# Patient Record
Sex: FEMALE | Race: BLACK OR AFRICAN AMERICAN | URBAN - METROPOLITAN AREA
[De-identification: names, ages, dates, MRNs, and addresses within clinical notes are randomized per-mention and may not be internally consistent; named-entity substitution may affect disease eponyms.]

---

## 2018-08-24 ENCOUNTER — TELEPHONE (OUTPATIENT)
Dept: FAMILY MEDICINE CLINIC | Age: 28
End: 2018-08-24

## 2018-10-23 ENCOUNTER — OFFICE VISIT (OUTPATIENT)
Dept: FAMILY MEDICINE CLINIC | Age: 28
End: 2018-10-23

## 2018-10-23 ENCOUNTER — HOSPITAL ENCOUNTER (OUTPATIENT)
Dept: LAB | Age: 28
Discharge: HOME | End: 2018-10-23
Payer: COMMERCIAL

## 2018-10-23 VITALS
RESPIRATION RATE: 20 BRPM | TEMPERATURE: 98.3 F | DIASTOLIC BLOOD PRESSURE: 84 MMHG | SYSTOLIC BLOOD PRESSURE: 118 MMHG | OXYGEN SATURATION: 98 % | WEIGHT: 271.6 LBS | HEIGHT: 72 IN | BODY MASS INDEX: 36.79 KG/M2 | HEART RATE: 87 BPM

## 2018-10-23 DIAGNOSIS — R40.0 DAYTIME SOMNOLENCE: ICD-10-CM

## 2018-10-23 DIAGNOSIS — G43.109 MIGRAINE WITH AURA AND WITHOUT STATUS MIGRAINOSUS, NOT INTRACTABLE: ICD-10-CM

## 2018-10-23 DIAGNOSIS — R79.89 ELEVATED TSH: Primary | ICD-10-CM

## 2018-10-23 PROBLEM — E66.01 SEVERE OBESITY (HCC): Status: ACTIVE | Noted: 2018-10-23

## 2018-10-23 LAB
ERYTHROCYTE [DISTWIDTH] IN BLOOD BY AUTOMATED COUNT: 16 % (ref 11.6–14.5)
HCT VFR BLD AUTO: 34.3 % (ref 35–45)
HGB BLD-MCNC: 10.6 G/DL (ref 12–16)
MCH RBC QN AUTO: 21.1 PG (ref 24–34)
MCHC RBC AUTO-ENTMCNC: 30.9 G/DL (ref 31–37)
MCV RBC AUTO: 68.2 FL (ref 74–97)
PLATELET # BLD AUTO: 455 K/UL (ref 135–420)
PMV BLD AUTO: 9.5 FL (ref 9.2–11.8)
RBC # BLD AUTO: 5.03 M/UL (ref 4.2–5.3)
WBC # BLD AUTO: 8.1 K/UL (ref 4.6–13.2)

## 2018-10-23 PROCEDURE — 84443 ASSAY THYROID STIM HORMONE: CPT | Performed by: INTERNAL MEDICINE

## 2018-10-23 PROCEDURE — 84481 FREE ASSAY (FT-3): CPT | Performed by: INTERNAL MEDICINE

## 2018-10-23 PROCEDURE — 80053 COMPREHEN METABOLIC PANEL: CPT | Performed by: INTERNAL MEDICINE

## 2018-10-23 PROCEDURE — 83520 IMMUNOASSAY QUANT NOS NONAB: CPT | Performed by: INTERNAL MEDICINE

## 2018-10-23 PROCEDURE — 85027 COMPLETE CBC AUTOMATED: CPT | Performed by: INTERNAL MEDICINE

## 2018-10-23 PROCEDURE — 36415 COLL VENOUS BLD VENIPUNCTURE: CPT | Performed by: INTERNAL MEDICINE

## 2018-10-23 PROCEDURE — 86376 MICROSOMAL ANTIBODY EACH: CPT | Performed by: INTERNAL MEDICINE

## 2018-10-23 RX ORDER — SUMATRIPTAN 50 MG/1
50 TABLET, FILM COATED ORAL
Qty: 9 TAB | Refills: 3 | Status: SHIPPED | OUTPATIENT
Start: 2018-10-23 | End: 2018-10-23

## 2018-10-23 RX ORDER — TOPIRAMATE 25 MG/1
50 TABLET ORAL DAILY
Qty: 60 TAB | Refills: 3 | Status: SHIPPED | OUTPATIENT
Start: 2018-10-23 | End: 2019-01-02 | Stop reason: SDUPTHER

## 2018-10-23 NOTE — PROGRESS NOTES
Assessment/Plan:   
Diagnoses and all orders for this visit: 1. Elevated TSH- off meds. Ck labs and then can start synthroid -     T3, FREE; Future -     THYROID PEROXIDASE (TPO) AB; Future 
-     TSH RECEPTOR AB; Future 2. Migraine with aura and without status migrainosus, not intractable- start topamax. imitrex prn.  F/u in 2 mos. -     topiramate (TOPAMAX) 25 mg tablet; Take 2 Tabs by mouth daily. Start 1/2 tab daily x 1 week, then increase to 1/2 tab bid x 1 week, then increase 1 tab bid 
-     SUMAtriptan (IMITREX) 50 mg tablet; Take 1 Tab by mouth once as needed for Migraine for up to 1 dose. -     METABOLIC PANEL, COMPREHENSIVE; Future 3. Daytime somnolence- consider sleep study given snoring. But will r/o thyroid issues first.  Inadequate sleep is also contributing 
-     CBC W/O DIFF; Future The plan was discussed with the patient. The patient verbalized understanding and is in agreement with the plan. All medication potential side effects were discussed with the patient. Health Maintenance:  
Health Maintenance Topic Date Due  
 DTaP/Tdap/Td series (1 - Tdap) 04/12/2010  PAP AKA CERVICAL CYTOLOGY  09/01/2021  Influenza Age 5 to Adult  Completed David Mathews is a 33 y.o.  female and presents with Establish Care Subjective: 
Pt is here to establish care. Pt c/o migraines. Diagnosed when she was in middle school. Worsened 4 years ago, after having her child. Was put on topamax, which helped. But then she ran out. She's getting migraines almost daily- she feels a pressure on her eyes, frontal region throbbing. +photophobia and phonophobia. +aura. +nausea/vomiting occ. Takes tylenol w/o relief.   +snoring and daytime somnolence. No nocturia. She only gets about 5hrs of sleep per night b/c she works 3 jobs. Hypothyroid - dx during pregnancy 4 years ago.  Hasn't been on meds b/c she didn't have a PCP. No constipation/hair loss or dry skin. Thyroid issues run in the family and mother had thyroid ca. ROS: 
Constitutional: + recent weight change. No weakness/+fatigue. No f/c. Skin: No rashes, change in nails/hair, itching HENT: + HA, no dizziness. No hearing loss/tinnitus. No nasal congestion/discharge. Eyes: No change in vision, double/blurred vision or eye pain/redness. Cardiovascular: No CP/palpitations. No PEREZ/orthopnea/PND. Respiratory: No cough/sputum, dyspnea, wheezing. Gastointestinal: No dysphagia, reflux. No n/v. No constipation/diarrhea. No melena/rectal bleeding. Genitourinary: No dysuria, urinary hesitancy, nocturia, hematuria. No incontinence. Musculoskeletal: No joint pain/stiffness. No muscle pain/tenderness. Endo: + heat/no cold intolerance, no polyuria/polydypsia. Heme: + h/o anemia. No easy bleeding/bruising. Allergy/Immunology: + seasonal rhinitis. Denies frequent colds, sinus/ear infections. Neurological: No seizures/numbness/weakness. No paresthesias. Psychiatric:  No depression, anxiety. PMH: 
Past Medical History:  
Diagnosis Date  Hypothyroid  Migraine PSH: 
Past Surgical History:  
Procedure Laterality Date  HX  SECTION    
  
 
SH: 
Social History Tobacco Use  Smoking status: Never Smoker  Smokeless tobacco: Never Used Substance Use Topics  Alcohol use: Yes Frequency: 2-4 times a month Drinks per session: 3 or 4 Binge frequency: Never Comment: Occation  Drug use: No  
 
FH: 
Family History Problem Relation Age of Onset  Hypertension Mother Gregory.Deem Other Mother Medications/Allergies: No current outpatient medications on file. No Known Allergies Objective: 
Visit Vitals /84 (BP 1 Location: Left arm, BP Patient Position: Sitting) Pulse 87 Temp 98.3 °F (36.8 °C) (Oral) Resp 20 Ht 6' (1.829 m) Wt 271 lb 9.6 oz (123.2 kg) SpO2 98% BMI 36.84 kg/m² Constitutional: Well developed, nourished, no distress, alert, obese habitus HENT: Exterior ears and tympanic membranes normal bilaterally. Supple neck. No thyromegaly or lymphadenopathy. Oropharynx clear and moist mucous membranes. Eyes: Conjunctiva normal. PERRL. Cardiovascular: S1, S2.  RRR. No murmurs/rubs. No thrills palpated. No carotid bruits. Intact distal pulses. No edema. Pulmonary/Chest Wall: No abnormalities on inspection. Clear to auscultation bilaterally. No wheezing/rhonchi. Normal effort. GI: Soft, nontender, nondistended. Normal active bowel sounds. No  masses on palpation. No hepatosplenomegaly. Musculoskeletal: Gait normal.  Joints without deformity/tenderness. Neurological: Appropriate. No focal motor or sensory deficits. Speech normal.  
Skin: No lesions/rashes on inspection. Psych: Appropriate affect, judgement and insight. Short-term memory intact.

## 2018-10-23 NOTE — PROGRESS NOTES
Louise Payne is a 33 y.o. female (: 1990) presenting to address: Chief Complaint Patient presents with [address] [place] [address] Vitals:  
 10/23/18 1407 BP: 118/84 Pulse: 87 Resp: 20 Temp: 98.3 °F (36.8 °C) TempSrc: Oral  
SpO2: 98% Weight: 271 lb 9.6 oz (123.2 kg) Height: 6' (1.829 m) PainSc:   6 PainLoc: Head Hearing/Vision:  
 
 Visual Acuity Screening Right eye Left eye Both eyes Without correction:     
With correction: 20/15 20/20 20/15 Learning Assessment:  
 
Learning Assessment 10/23/2018 PRIMARY LEARNER Patient HIGHEST LEVEL OF EDUCATION - PRIMARY LEARNER  SOME COLLEGE  
BARRIERS PRIMARY LEARNER NONE  
CO-LEARNER CAREGIVER No  
PRIMARY LANGUAGE ENGLISH  NEED No  
LEARNER PREFERENCE PRIMARY READING  
LEARNING SPECIAL TOPICS none ANSWERED BY patient RELATIONSHIP SELF  
ASSESSMENT COMMENT n/a Depression Screening: PHQ over the last two weeks 10/23/2018 Little interest or pleasure in doing things Not at all Feeling down, depressed, irritable, or hopeless Not at all Total Score PHQ 2 0 Fall Risk Assessment:  
 
Fall Risk Assessment, last 12 mths 10/23/2018 Able to walk? Yes Fall in past 12 months? No  
 
Abuse Screening:  
 
Abuse Screening Questionnaire 10/23/2018 Do you ever feel afraid of your partner? Ariel Serrato Are you in a relationship with someone who physically or mentally threatens you? Clintonosiris Rojelio Is it safe for you to go home? Tex Schaffer Advanced Directive: 1. Do you have an Advanced Directive? NO 
 
2. Would you like information on Advanced Directives?  YES

## 2018-10-24 DIAGNOSIS — D50.9 MICROCYTIC ANEMIA: Primary | ICD-10-CM

## 2018-10-24 DIAGNOSIS — E87.6 HYPOKALEMIA: ICD-10-CM

## 2018-10-24 LAB
ALBUMIN SERPL-MCNC: 3.7 G/DL (ref 3.4–5)
ALBUMIN/GLOB SERPL: 1.1 {RATIO} (ref 0.8–1.7)
ALP SERPL-CCNC: 36 U/L (ref 45–117)
ALT SERPL-CCNC: 21 U/L (ref 13–56)
ANION GAP SERPL CALC-SCNC: 4 MMOL/L (ref 3–18)
AST SERPL-CCNC: 10 U/L (ref 15–37)
BILIRUB SERPL-MCNC: 0.2 MG/DL (ref 0.2–1)
BUN SERPL-MCNC: 8 MG/DL (ref 7–18)
BUN/CREAT SERPL: 15 (ref 12–20)
CALCIUM SERPL-MCNC: 8.7 MG/DL (ref 8.5–10.1)
CHLORIDE SERPL-SCNC: 107 MMOL/L (ref 100–108)
CO2 SERPL-SCNC: 30 MMOL/L (ref 21–32)
CREAT SERPL-MCNC: 0.55 MG/DL (ref 0.6–1.3)
GLOBULIN SER CALC-MCNC: 3.4 G/DL (ref 2–4)
GLUCOSE SERPL-MCNC: 86 MG/DL (ref 74–99)
POTASSIUM SERPL-SCNC: 3.3 MMOL/L (ref 3.5–5.5)
PROT SERPL-MCNC: 7.1 G/DL (ref 6.4–8.2)
SODIUM SERPL-SCNC: 141 MMOL/L (ref 136–145)
T3FREE SERPL-MCNC: 2.3 PG/ML (ref 2.18–3.98)

## 2018-10-24 NOTE — PROGRESS NOTES
Tell pt her thyroid labs are still pending. However, her potassium is low. I want her to take an otc potassium daily. We will delio her levels in 2 weeks. In addition, she has mild anemia.  i'm going to check iron levels with the next draw.

## 2018-10-25 DIAGNOSIS — E03.8 HYPOTHYROIDISM DUE TO HASHIMOTO'S THYROIDITIS: Primary | ICD-10-CM

## 2018-10-25 DIAGNOSIS — E06.3 HYPOTHYROIDISM DUE TO HASHIMOTO'S THYROIDITIS: Primary | ICD-10-CM

## 2018-10-25 LAB
THYROPEROXIDASE AB SERPL-ACNC: 83 IU/ML (ref 0–34)
TSH RECEP AB SER-ACNC: 0.67 IU/L (ref 0–1.75)
TSH SERPL DL<=0.05 MIU/L-ACNC: 10 UIU/ML (ref 0.36–3.74)

## 2018-10-25 RX ORDER — LEVOTHYROXINE SODIUM 75 UG/1
75 TABLET ORAL
Qty: 90 TAB | Refills: 0 | Status: SHIPPED | OUTPATIENT
Start: 2018-10-25 | End: 2019-01-02 | Stop reason: SDUPTHER

## 2018-11-13 DIAGNOSIS — E06.3 HYPOTHYROIDISM DUE TO HASHIMOTO'S THYROIDITIS: Primary | ICD-10-CM

## 2018-11-13 DIAGNOSIS — E03.8 HYPOTHYROIDISM DUE TO HASHIMOTO'S THYROIDITIS: Primary | ICD-10-CM

## 2018-11-13 DIAGNOSIS — Z80.8 FAMILY HISTORY OF THYROID CANCER: ICD-10-CM

## 2018-11-16 ENCOUNTER — HOSPITAL ENCOUNTER (OUTPATIENT)
Dept: LAB | Age: 28
Discharge: HOME | End: 2018-11-16
Payer: COMMERCIAL

## 2018-11-16 DIAGNOSIS — E03.8 HYPOTHYROIDISM DUE TO HASHIMOTO'S THYROIDITIS: ICD-10-CM

## 2018-11-16 DIAGNOSIS — E06.3 HYPOTHYROIDISM DUE TO HASHIMOTO'S THYROIDITIS: ICD-10-CM

## 2018-11-16 DIAGNOSIS — D50.9 MICROCYTIC ANEMIA: ICD-10-CM

## 2018-11-16 DIAGNOSIS — E87.6 HYPOKALEMIA: ICD-10-CM

## 2018-11-16 LAB
FERRITIN SERPL-MCNC: 10 NG/ML (ref 8–388)
IRON SATN MFR SERPL: 21 %
IRON SERPL-MCNC: 77 UG/DL (ref 50–175)
POTASSIUM SERPL-SCNC: 3.8 MMOL/L (ref 3.5–5.5)
TIBC SERPL-MCNC: 375 UG/DL (ref 250–450)
TSH SERPL DL<=0.05 MIU/L-ACNC: 7.7 UIU/ML (ref 0.36–3.74)

## 2018-11-16 PROCEDURE — 82728 ASSAY OF FERRITIN: CPT

## 2018-11-16 PROCEDURE — 84132 ASSAY OF SERUM POTASSIUM: CPT

## 2018-11-16 PROCEDURE — 84443 ASSAY THYROID STIM HORMONE: CPT

## 2018-11-16 PROCEDURE — 83540 ASSAY OF IRON: CPT

## 2018-11-16 PROCEDURE — 36415 COLL VENOUS BLD VENIPUNCTURE: CPT

## 2019-01-02 DIAGNOSIS — G43.109 MIGRAINE WITH AURA AND WITHOUT STATUS MIGRAINOSUS, NOT INTRACTABLE: ICD-10-CM

## 2019-01-02 RX ORDER — TOPIRAMATE 25 MG/1
50 TABLET ORAL 2 TIMES DAILY WITH MEALS
Qty: 180 TAB | Refills: 0 | Status: SHIPPED | OUTPATIENT
Start: 2019-01-02

## 2019-01-02 RX ORDER — LEVOTHYROXINE SODIUM 75 UG/1
75 TABLET ORAL
Qty: 90 TAB | Refills: 0 | Status: SHIPPED | OUTPATIENT
Start: 2019-01-02